# Patient Record
Sex: FEMALE | Race: WHITE | NOT HISPANIC OR LATINO | Employment: FULL TIME | URBAN - METROPOLITAN AREA
[De-identification: names, ages, dates, MRNs, and addresses within clinical notes are randomized per-mention and may not be internally consistent; named-entity substitution may affect disease eponyms.]

---

## 2022-07-08 RX ORDER — ATORVASTATIN CALCIUM 10 MG/1
TABLET, FILM COATED ORAL
COMMUNITY

## 2022-07-08 RX ORDER — SERTRALINE HYDROCHLORIDE 100 MG/1
TABLET, FILM COATED ORAL
COMMUNITY

## 2023-04-17 ENCOUNTER — TRANSCRIBE ORDERS (OUTPATIENT)
Dept: DIABETES SERVICES | Facility: HOSPITAL | Age: 38
End: 2023-04-17

## 2023-04-18 ENCOUNTER — TRANSCRIBE ORDERS (OUTPATIENT)
Dept: DIABETES SERVICES | Facility: HOSPITAL | Age: 38
End: 2023-04-18

## 2023-04-19 ENCOUNTER — HOSPITAL ENCOUNTER (OUTPATIENT)
Dept: DIABETES SERVICES | Facility: HOSPITAL | Age: 38
Discharge: HOME | End: 2023-04-19
Admitting: OBSTETRICS & GYNECOLOGY
Payer: MEDICAID

## 2023-04-19 PROCEDURE — G0108 DIAB MANAGE TRN  PER INDIV: HCPCS

## 2023-04-20 ENCOUNTER — TELEPHONE (OUTPATIENT)
Dept: DIABETES SERVICES | Facility: HOSPITAL | Age: 38
End: 2023-04-20
Payer: MEDICAID

## 2023-04-20 NOTE — NURSING NOTE
[facility]   Diabetes Management       Date:  4/20/2023    Patient:  Astrid Alcala     MRN:  [id number]    Gestational age:  Unknown   No obstetric history on file.    Diagnosis:  Gestational Diabetes A1    Management Role:  Phase I:  Counseling and Education     Insulin dosing:   Enter insulin dosing      Summary   The patient was seen for gestational diabetes education at [facility]. Pt accompanied this morning by supportive corey Martinez. Pt reports having started home BG checks with FBGs ranging 106-155 per pt. Discuss w/pt role of insulin in pregnancy. Pt reports giving insulin injections in the past to her grandmother. Advise pt to notify primary OB of elevations. Agree w/pt I will also communicate to OB team pt is having elevated FBGs.  Discuss meal planning and the importance of eating more frequently. Pt typically does not eat breakfast, and is drinking cokes though she has decreased the amount.   Pt encouraged to reach out to me or primary OB for follow up questions. Pt verbalizes understanding.       Referring provider:  Provider, [place] [address]      Antonieta Nevarez, RN, BSN, [facility]  4/20/2023  11:13 EDT

## 2023-04-26 ENCOUNTER — TELEPHONE (OUTPATIENT)
Dept: DIABETES SERVICES | Facility: HOSPITAL | Age: 38
End: 2023-04-26
Payer: MEDICAID

## 2023-04-26 NOTE — TELEPHONE ENCOUNTER
Call pt to inquire whether she has been referred to endo for BG mgt (or MFM.) She states she has not been referred at this time to endo but her MD has started a 5 mg dose of an oral DM agent. Pt c/o her BGs being in the 60's all day and experiencing shaking at work. Advise pt to call OB. She states she has called the OB office,  but has not gotten a call back as yet. Advise pt to re-call OB office re low BGs. Pt ladonna.u.

## 2023-09-29 ENCOUNTER — APPOINTMENT (OUTPATIENT)
Dept: CT IMAGING | Facility: HOSPITAL | Age: 38
End: 2023-09-29
Payer: MEDICAID

## 2023-09-29 ENCOUNTER — HOSPITAL ENCOUNTER (EMERGENCY)
Facility: HOSPITAL | Age: 38
Discharge: OTHER INSTITUTION | End: 2023-09-29
Attending: PEDIATRICS
Payer: MEDICAID

## 2023-09-29 ENCOUNTER — APPOINTMENT (OUTPATIENT)
Dept: ULTRASOUND IMAGING | Facility: HOSPITAL | Age: 38
End: 2023-09-29
Payer: MEDICAID

## 2023-09-29 VITALS
HEIGHT: 62 IN | WEIGHT: 230 LBS | OXYGEN SATURATION: 98 % | RESPIRATION RATE: 20 BRPM | TEMPERATURE: 98.8 F | HEART RATE: 77 BPM | DIASTOLIC BLOOD PRESSURE: 82 MMHG | BODY MASS INDEX: 42.33 KG/M2 | SYSTOLIC BLOOD PRESSURE: 128 MMHG

## 2023-09-29 DIAGNOSIS — I26.94 MULTIPLE SUBSEGMENTAL PULMONARY EMBOLI WITHOUT ACUTE COR PULMONALE: Primary | ICD-10-CM

## 2023-09-29 LAB
ALBUMIN SERPL-MCNC: 3.8 G/DL (ref 3.5–5.2)
ALBUMIN/GLOB SERPL: 1.5 G/DL
ALP SERPL-CCNC: 67 U/L (ref 39–117)
ALT SERPL W P-5'-P-CCNC: 28 U/L (ref 1–33)
ANION GAP SERPL CALCULATED.3IONS-SCNC: 12.1 MMOL/L (ref 5–15)
AST SERPL-CCNC: 23 U/L (ref 1–32)
BASOPHILS # BLD AUTO: 0.05 10*3/MM3 (ref 0–0.2)
BASOPHILS NFR BLD AUTO: 0.7 % (ref 0–1.5)
BILIRUB SERPL-MCNC: 0.3 MG/DL (ref 0–1.2)
BILIRUB UR QL STRIP: NEGATIVE
BUN SERPL-MCNC: 11 MG/DL (ref 6–20)
BUN/CREAT SERPL: 12 (ref 7–25)
CALCIUM SPEC-SCNC: 8.8 MG/DL (ref 8.6–10.5)
CHLORIDE SERPL-SCNC: 105 MMOL/L (ref 98–107)
CLARITY UR: CLEAR
CO2 SERPL-SCNC: 22.9 MMOL/L (ref 22–29)
COLOR UR: YELLOW
CREAT SERPL-MCNC: 0.92 MG/DL (ref 0.57–1)
D-LACTATE SERPL-SCNC: 1.3 MMOL/L (ref 0.5–2)
DEPRECATED RDW RBC AUTO: 50.2 FL (ref 37–54)
EGFRCR SERPLBLD CKD-EPI 2021: 81.9 ML/MIN/1.73
EOSINOPHIL # BLD AUTO: 0.29 10*3/MM3 (ref 0–0.4)
EOSINOPHIL NFR BLD AUTO: 4 % (ref 0.3–6.2)
ERYTHROCYTE [DISTWIDTH] IN BLOOD BY AUTOMATED COUNT: 14.4 % (ref 12.3–15.4)
GLOBULIN UR ELPH-MCNC: 2.6 GM/DL
GLUCOSE SERPL-MCNC: 102 MG/DL (ref 65–99)
GLUCOSE UR STRIP-MCNC: NEGATIVE MG/DL
HCG INTACT+B SERPL-ACNC: 10.35 MIU/ML
HCT VFR BLD AUTO: 31.4 % (ref 34–46.6)
HGB BLD-MCNC: 9.5 G/DL (ref 12–15.9)
HGB UR QL STRIP.AUTO: ABNORMAL
IMM GRANULOCYTES # BLD AUTO: 0.02 10*3/MM3 (ref 0–0.05)
IMM GRANULOCYTES NFR BLD AUTO: 0.3 % (ref 0–0.5)
KETONES UR QL STRIP: NEGATIVE
LEUKOCYTE ESTERASE UR QL STRIP.AUTO: ABNORMAL
LIPASE SERPL-CCNC: 34 U/L (ref 13–60)
LYMPHOCYTES # BLD AUTO: 2.6 10*3/MM3 (ref 0.7–3.1)
LYMPHOCYTES NFR BLD AUTO: 36.1 % (ref 19.6–45.3)
MCH RBC QN AUTO: 28.4 PG (ref 26.6–33)
MCHC RBC AUTO-ENTMCNC: 30.3 G/DL (ref 31.5–35.7)
MCV RBC AUTO: 94 FL (ref 79–97)
MONOCYTES # BLD AUTO: 0.56 10*3/MM3 (ref 0.1–0.9)
MONOCYTES NFR BLD AUTO: 7.8 % (ref 5–12)
NEUTROPHILS NFR BLD AUTO: 3.68 10*3/MM3 (ref 1.7–7)
NEUTROPHILS NFR BLD AUTO: 51.1 % (ref 42.7–76)
NITRITE UR QL STRIP: NEGATIVE
NT-PROBNP SERPL-MCNC: 156 PG/ML (ref 0–450)
PH UR STRIP.AUTO: <=5 [PH] (ref 5–8)
PLATELET # BLD AUTO: 312 10*3/MM3 (ref 140–450)
PMV BLD AUTO: 10.8 FL (ref 6–12)
POTASSIUM SERPL-SCNC: 4 MMOL/L (ref 3.5–5.2)
PROT SERPL-MCNC: 6.4 G/DL (ref 6–8.5)
PROT UR QL STRIP: NEGATIVE
QT INTERVAL: 423 MS
QTC INTERVAL: 420 MS
RBC # BLD AUTO: 3.34 10*6/MM3 (ref 3.77–5.28)
SODIUM SERPL-SCNC: 140 MMOL/L (ref 136–145)
SP GR UR STRIP: <=1.005 (ref 1–1.03)
TROPONIN T SERPL HS-MCNC: <6 NG/L
UROBILINOGEN UR QL STRIP: ABNORMAL
WBC NRBC COR # BLD: 7.2 10*3/MM3 (ref 3.4–10.8)

## 2023-09-29 PROCEDURE — 25510000001 IOPAMIDOL PER 1 ML: Performed by: PEDIATRICS

## 2023-09-29 PROCEDURE — 84484 ASSAY OF TROPONIN QUANT: CPT | Performed by: PEDIATRICS

## 2023-09-29 PROCEDURE — 83880 ASSAY OF NATRIURETIC PEPTIDE: CPT | Performed by: PEDIATRICS

## 2023-09-29 PROCEDURE — 93005 ELECTROCARDIOGRAM TRACING: CPT | Performed by: PEDIATRICS

## 2023-09-29 PROCEDURE — 83605 ASSAY OF LACTIC ACID: CPT | Performed by: PEDIATRICS

## 2023-09-29 PROCEDURE — 71275 CT ANGIOGRAPHY CHEST: CPT

## 2023-09-29 PROCEDURE — 83690 ASSAY OF LIPASE: CPT | Performed by: PEDIATRICS

## 2023-09-29 PROCEDURE — 80053 COMPREHEN METABOLIC PANEL: CPT | Performed by: PEDIATRICS

## 2023-09-29 PROCEDURE — 81003 URINALYSIS AUTO W/O SCOPE: CPT | Performed by: PEDIATRICS

## 2023-09-29 PROCEDURE — 99285 EMERGENCY DEPT VISIT HI MDM: CPT

## 2023-09-29 PROCEDURE — 85025 COMPLETE CBC W/AUTO DIFF WBC: CPT | Performed by: PEDIATRICS

## 2023-09-29 PROCEDURE — 93971 EXTREMITY STUDY: CPT

## 2023-09-29 PROCEDURE — 84702 CHORIONIC GONADOTROPIN TEST: CPT | Performed by: PEDIATRICS

## 2023-09-29 RX ADMIN — IOPAMIDOL 100 ML: 755 INJECTION, SOLUTION INTRAVENOUS at 12:14

## 2023-09-29 NOTE — FSED PROVIDER NOTE
Emergency Medicine Evaluation Note  Subjective   History of Present Illness    HPI: Astrid Alcala is a 38 y.o. female who presents to the ED with with complaints of left calf pain that began roughly a week and a half prior to presentation.  Patient states pain is worse with palpation and with motion, better with rest.  Patient denies medical history or regular medications however history significant for recent delivery at [facility] with Dr. Elieser Gomes.  Patient unsure of specifics however states postpartum hemorrhage, states D&C, states she was told she received large amounts of blood, patient states that she believes that she coded during event.  Patient denies personal history of blood clots however states multiple family members with hereditary clotting disorder, unsure of specifics, states multiple family members with use of blood thinners.  Patient not currently on any anticoagulation.  Patient denies any chest pain, shortness of breath, nausea vomiting or diaphoresis.  Patient denies any dyspnea on exertion. No other concomitant symptoms. No other known aggravating or alleviating factors.      ROS  Review of Systems   Constitutional: Negative.  Negative for chills, fatigue and fever.   HENT: Negative.     Eyes: Negative.    Respiratory: Negative.  Negative for cough, shortness of breath and wheezing.    Cardiovascular: Negative.  Negative for chest pain, palpitations and leg swelling.   Gastrointestinal: Negative.  Negative for abdominal pain, blood in stool, diarrhea, nausea and vomiting.   Endocrine: Negative.    Genitourinary: Negative.  Negative for decreased urine volume, dysuria, flank pain, hematuria, menstrual problem, pelvic pain, urgency, vaginal bleeding, vaginal discharge and vaginal pain.   Musculoskeletal: Negative.         As described above   Skin: Negative.    Allergic/Immunologic: Negative.    Neurological: Negative.    Hematological: Negative.    Psychiatric/Behavioral:  "Negative.       Previous History:  No past medical history on file.  No past surgical history on file.     No family history on file.  No Known Allergies  No current outpatient medications    Objective   Physical Exam  Patient Vitals for the past 24 hrs:   BP Temp Temp src Pulse Resp SpO2 Height Weight   09/29/23 1300 102/66 -- -- 58 14 97 % -- --   09/29/23 1227 125/70 -- -- 66 16 99 % -- --   09/29/23 1100 105/70 -- -- 71 15 98 % -- --   09/29/23 0955 122/73 98.8 °F (37.1 °C) Oral 71 16 99 % 157.5 cm (62\") 104 kg (230 lb)   09/29/23 0943 -- -- -- 82 -- 96 % -- --     Physical Exam  Vitals and nursing note reviewed.   Constitutional:       General: She is not in acute distress.     Appearance: She is normal weight. She is not toxic-appearing.   HENT:      Head: Normocephalic and atraumatic.      Nose: Nose normal. No congestion.      Mouth/Throat:      Mouth: Mucous membranes are moist.      Pharynx: Oropharynx is clear. No oropharyngeal exudate or posterior oropharyngeal erythema.   Eyes:      General:         Right eye: No discharge.         Left eye: No discharge.      Extraocular Movements: Extraocular movements intact.      Conjunctiva/sclera: Conjunctivae normal.   Cardiovascular:      Rate and Rhythm: Normal rate and regular rhythm.      Pulses: Normal pulses.      Heart sounds: Normal heart sounds. No murmur heard.  Pulmonary:      Effort: Pulmonary effort is normal. No respiratory distress.      Breath sounds: Normal breath sounds. No wheezing, rhonchi or rales.   Chest:      Chest wall: No tenderness.   Abdominal:      General: Abdomen is flat. There is no distension.      Palpations: Abdomen is soft.      Tenderness: There is no abdominal tenderness. There is no right CVA tenderness, left CVA tenderness, guarding or rebound.   Musculoskeletal:         General: No swelling, tenderness, deformity or signs of injury. Normal range of motion.      Cervical back: Normal range of motion and neck supple. No " rigidity or tenderness.      Right lower leg: No edema.      Left lower leg: No edema.      Comments: Full passive and active range of motion upper and lower extremities bilaterally.  No unilateral leg swelling or tenderness palpation over deep veins.  Distal neurovascularity intact.  Compartments soft.   Skin:     General: Skin is warm and dry.      Capillary Refill: Capillary refill takes less than 2 seconds.   Neurological:      General: No focal deficit present.      Mental Status: She is alert and oriented to person, place, and time. Mental status is at baseline.   Psychiatric:         Mood and Affect: Mood normal.         Behavior: Behavior normal.     Results  Labs Reviewed   COMPREHENSIVE METABOLIC PANEL - Abnormal; Notable for the following components:       Result Value    Glucose 102 (*)     All other components within normal limits    Narrative:     GFR Normal >60  Chronic Kidney Disease <60  Kidney Failure <15     URINALYSIS WITHOUT MICROSCOPIC (NO CULTURE) - Abnormal; Notable for the following components:    Blood, UA Large (3+) (*)     Leuk Esterase, UA Trace (*)     All other components within normal limits   CBC WITH AUTO DIFFERENTIAL - Abnormal; Notable for the following components:    RBC 3.34 (*)     Hemoglobin 9.5 (*)     Hematocrit 31.4 (*)     MCHC 30.3 (*)     All other components within normal limits   LACTIC ACID, PLASMA - Normal   LIPASE - Normal   SINGLE HSTROPONIN T - Normal    Narrative:     High Sensitive Troponin T Reference Range:  <10.0 ng/L- Negative Female for AMI  <15.0 ng/L- Negative Male for AMI  >=10 - Abnormal Female indicating possible myocardial injury.  >=15 - Abnormal Male indicating possible myocardial injury.   Clinicians would have to utilize clinical acumen, EKG, Troponin, and serial changes to determine if it is an Acute Myocardial Infarction or myocardial injury due to an underlying chronic condition.        BNP (IN-HOUSE) - Normal    Narrative:     This assay is  used as an aid in the diagnosis of individuals suspected of having heart failure. It can be used as an aid in the diagnosis of acute decompensated heart failure (ADHF) in patients presenting with signs and symptoms of ADHF to the emergency department (ED). In addition, NT-proBNP of <300 pg/mL indicates ADHF is not likely.   HCG, QUANTITATIVE, PREGNANCY    Narrative:     HCG Ranges by Gestational Age    Females - non-pregnant premenopausal   </= 1mIU/mL HCG  Females - postmenopausal               </= 7mIU/mL HCG    3 Weeks       5.4   -      72 mIU/mL  4 Weeks      10.2   -     708 mIU/mL  5 Weeks       217   -   8,245 mIU/mL  6 Weeks       152   -  32,177 mIU/mL  7 Weeks     4,059   - 153,767 mIU/mL  8 Weeks    31,366   - 149,094 mIU/mL  9 Weeks    59,109   - 135,901 mIU/mL  10 Weeks   44,186   - 170,409 mIU/mL  12 Weeks   27,107   - 201,615 mIU/mL  14 Weeks   24,302   -  93,646 mIU/mL  15 Weeks   12,540   -  69,747 mIU/mL  16 Weeks    8,904   -  55,332 mIU/mL  17 Weeks    8,240   -  51,793 mIU/mL  18 Weeks    9,649   -  55,271 mIU/mL     CBC AND DIFFERENTIAL    Narrative:     The following orders were created for panel order CBC & Differential.  Procedure                               Abnormality         Status                     ---------                               -----------         ------                     CBC Auto Differential[[id number]]        Abnormal            Final result                 Please view results for these tests on the individual orders.     CT Angiogram Chest Pulmonary Embolism   Final Result   Impression:   Findings are compatible with bilateral pulmonary embolism.            Electronically Signed: Tasha Pizarro MD     9/29/2023 12:20 PM EDT     Workstation ID: [id number]      US Venous Doppler Lower Extremity Left (duplex)   Final Result   Impression:   No sonographic evidence of left lower extremity deep venous thrombosis.            Electronically Signed: Comfort An MD      9/29/2023 12:12 PM EDT     Workstation ID: [id number]        The laboratory results, imaging results and other diagnostic exam results were reviewed in the EMR.     Procedures  Procedures  EKG: normal EKG, normal sinus rhythm, rate 59, , QRS 86, QTc 420.  No acute ST changes, no STEMI.  No previous to compare to.      Medical Decision Making    Patient presents to the ED as described above.  Vital signs stable and unremarkable.  Physical exam as described above.  Given patient's family history of clotting, pro coagulable state given recent delivery, imaging obtained as described above.  Ultrasound without DVT however CT as described above with multiple lobar and segmental branches to the right upper lobe; lobar, segmental, and subsegmental branches to the right middle and right lower lobes and involving segmental and subsegmental branches to the left lower lobe.     Given findings, EKG obtained without evidence of heart strain.  Troponin BMP ordered, unremarkable.  No massive or submassive PE at this time, patient resting comfortably, in no acute distress, denying any chest pain, pleuritic pain, hemoptysis, dyspnea.  No blood products available at this facility, no ability for  to provide blood products, thus concern for initiating blood thinners at this time given patient's history of postpartum hemorrhage without resources at freestanding facility to stop bleed.  Given patient hemodynamically stable without evidence of heart strain, asymptomatic on multiple subsequent evaluations, will withhold thinners as greater concern for hemorrhage without ability to control bleed as greater risk to patient at this time.  Patient's case discussed with hospitalist Dr. Barcenas at 1244 at [facility] who agrees with admission for further evaluation and management, recommends starting thinners, requests type and screen, blood proximity available at bedside.  After phone conversation, further calls to [facility]  to provide the services at this facility, limitation discussed with house supervisor at [facility] who informed excepting physician. Patient was informed of results throughout course of multiple subsequent evaluations.  They verbalized understanding and agreement with treatment care plan. All questions answered.    Diagnosis  Final diagnoses:   Multiple subsegmental pulmonary emboli without acute cor pulmonale       Disposition  ED Disposition       ED Disposition   Transfer to Another Facility     Condition   --    Comment   [facility]             No follow-up provider specified.

## 2023-09-29 NOTE — ED NOTES
"Pt reports vaginal delivery 2 wks ago; Pt sts baby was delivered without difficulty, however, \"something happened with the placenta\" Pt reports \"coding\"; sts lost over 2,000cc of blood, went unresponsive, had to undergo emergency D&C and blood transfusion immediately after delivery. Pt reports left calf pain started after arriving home from hospital.   "